# Patient Record
Sex: FEMALE | Race: WHITE
[De-identification: names, ages, dates, MRNs, and addresses within clinical notes are randomized per-mention and may not be internally consistent; named-entity substitution may affect disease eponyms.]

---

## 2019-07-17 ENCOUNTER — HOSPITAL ENCOUNTER (OUTPATIENT)
Dept: HOSPITAL 74 - FASU | Age: 34
Discharge: HOME | End: 2019-07-17
Attending: ORTHOPAEDIC SURGERY
Payer: COMMERCIAL

## 2019-07-17 VITALS — SYSTOLIC BLOOD PRESSURE: 110 MMHG | DIASTOLIC BLOOD PRESSURE: 67 MMHG

## 2019-07-17 VITALS — BODY MASS INDEX: 27.4 KG/M2

## 2019-07-17 VITALS — TEMPERATURE: 98.4 F | HEART RATE: 64 BPM

## 2019-07-17 DIAGNOSIS — L98.0: Primary | ICD-10-CM

## 2019-07-17 PROCEDURE — 0JBH0ZX EXCISION OF LEFT LOWER ARM SUBCUTANEOUS TISSUE AND FASCIA, OPEN APPROACH, DIAGNOSTIC: ICD-10-PCS | Performed by: ORTHOPAEDIC SURGERY

## 2019-07-18 NOTE — OP
DATE OF OPERATION:  07/17/2019

 

PREOPERATIVE DIAGNOSIS:  Left long finger mass.  

 

POSTOPERATIVE DIAGNOSIS:  Left long finger mass.  

 

OPERATIVE PROCEDURE:  Left long finger mass excision.  

 

SURGEON:  Chito Hicks M.D.

 

ASSISTANT:  BOSSMAN Lawrence

 

ANESTHESIA:  Local.  

 

COMPLICATIONS:  None.  

 

ESTIMATED BLOOD LOSS:  Minimal.  

 

INDICATIONS FOR PROCEDURE:  The patient is a 34-year-old female with the above

finding indicated for operative treatment.  The risks, benefits and alternatives were

discussed with her at length and proper informed consent was obtained.  

 

PROCEDURE:  After proper identification of the patient and correct operative site,

patient was brought to the operating room and placed supine on the table, all

prominences well-padded.  Local anesthesia was given with 2% lidocaine and adequate

for the procedure.  The left upper extremity was prepped and draped in the usual

sterile fashion.  Finger tourniquet was used.  The patient did have what appeared to

be a pyogenic granuloma in the volar aspect of the long finger.  This was ellipsed

out longitudinally, including the deep tissues, and electrocautery was used to stop

any bleeding and cauterize the area.  Care was taken to protect neurovascular

structures.  No further mass was noted and the incision was repaired in a

side-to-side fashion with nylon sutures.  Sterile dressings were applied.  Patient

was reversed from anesthesia.  She tolerated the procedure well.   Jose Gutierrez

was _____.  

 

 

CHITO HICKS M.D.

 

 

[id number]

DD: 07/18/2019 09:29

DT: 07/18/2019 10:16

Job #:  [id number]

## 2019-07-22 NOTE — PATH
Surgical Pathology Report



Patient Name:  MUSTAPHA TRAYLOR

Accession #:  [id number]

[facility]. Rec. #:  [id number]                                                        

   /Age/Gender:  1985 (Age: 34) / F

Account:  [id number]                                                          

             Location: [facility] 

Taken:  2019

Received:  2019

Reported:  2019

Physicians:  Rich King M.D.

  



Specimen(s) Received

 LEFT LONG FINGER MASS 





Clinical History

Left long finger mass







Final Diagnosis

LEFT LOWER FINGER, MASS, EXCISION:

LOBULAR CAPILLARY HEMANGIOMA (PYOGENIC GRANULOMA).





***Electronically Signed***

Digna Cardona M.D.





Gross Description

Received in formalin labeled "left long finger mass," is a 1.2 x 0.4 cm tan,

elliptical, unoriented portion of skin excised to a depth of 0.3 cm. The

epidermal surface displays a 0.5 x 0.3 cm tan, crusted lesion. The base is inked

green and the specimen is serially sectioned. The specimen is entirely submitted

in 2 cassettes as follows: 1-undesignated tips; 2-central portion of specimen

with lesion.

/2019



saudi/2019